# Patient Record
Sex: MALE | Race: WHITE | NOT HISPANIC OR LATINO | URBAN - METROPOLITAN AREA
[De-identification: names, ages, dates, MRNs, and addresses within clinical notes are randomized per-mention and may not be internally consistent; named-entity substitution may affect disease eponyms.]

---

## 2022-11-21 ENCOUNTER — OFFICE (OUTPATIENT)
Dept: URBAN - METROPOLITAN AREA CLINIC 29 | Facility: CLINIC | Age: 22
Setting detail: OPHTHALMOLOGY
End: 2022-11-21
Payer: COMMERCIAL

## 2022-11-21 ENCOUNTER — RX ONLY (RX ONLY)
Age: 22
End: 2022-11-21

## 2022-11-21 DIAGNOSIS — H52.13: ICD-10-CM

## 2022-11-21 DIAGNOSIS — H01.001: ICD-10-CM

## 2022-11-21 DIAGNOSIS — H01.005: ICD-10-CM

## 2022-11-21 DIAGNOSIS — H01.004: ICD-10-CM

## 2022-11-21 DIAGNOSIS — H16.223: ICD-10-CM

## 2022-11-21 DIAGNOSIS — H01.002: ICD-10-CM

## 2022-11-21 PROCEDURE — 92004 COMPRE OPH EXAM NEW PT 1/>: CPT | Performed by: OPTOMETRIST

## 2022-11-21 PROCEDURE — 92015 DETERMINE REFRACTIVE STATE: CPT | Performed by: OPTOMETRIST

## 2022-11-21 ASSESSMENT — REFRACTION_MANIFEST
OS_AXIS: 170
OS_VA1: 20/20
OD_CYLINDER: +0.75
OD_SPHERE: -0.75
OS_CYLINDER: +0.50
OD_VA1: 20/20
OD_AXIS: 170
OS_SPHERE: -0.75

## 2022-11-21 ASSESSMENT — TEAR BREAK UP TIME (TBUT)
OS_TBUT: 1+
OD_TBUT: 1+

## 2022-11-21 ASSESSMENT — SPHEQUIV_DERIVED
OD_SPHEQUIV: -0.375
OS_SPHEQUIV: -0.5
OS_SPHEQUIV: -0.5
OD_SPHEQUIV: -0.25

## 2022-11-21 ASSESSMENT — REFRACTION_AUTOREFRACTION
OD_CYLINDER: +1.00
OD_SPHERE: -0.75
OS_CYLINDER: +0.50
OS_SPHERE: -0.75
OD_AXIS: 170
OS_AXIS: 170

## 2022-11-21 ASSESSMENT — VISUAL ACUITY
OD_BCVA: 20/20-2
OS_BCVA: 20/25

## 2022-11-21 ASSESSMENT — CONFRONTATIONAL VISUAL FIELD TEST (CVF)
OS_FINDINGS: FULL
OD_FINDINGS: FULL

## 2022-11-21 ASSESSMENT — TONOMETRY
OD_IOP_MMHG: 15
OS_IOP_MMHG: 15

## 2022-11-21 ASSESSMENT — LID EXAM ASSESSMENTS
OS_BLEPHARITIS: LLL LUL 1+
OD_BLEPHARITIS: RLL RUL 1+

## 2024-03-19 ENCOUNTER — HOSPITAL ENCOUNTER (EMERGENCY)
Facility: OTHER | Age: 24
Discharge: HOME | End: 2024-03-19
Attending: EMERGENCY MEDICINE
Payer: COMMERCIAL

## 2024-03-19 VITALS
BODY MASS INDEX: 31.44 KG/M2 | SYSTOLIC BLOOD PRESSURE: 130 MMHG | DIASTOLIC BLOOD PRESSURE: 78 MMHG | TEMPERATURE: 98 F | RESPIRATION RATE: 18 BRPM | WEIGHT: 245 LBS | OXYGEN SATURATION: 99 % | HEART RATE: 85 BPM | HEIGHT: 74 IN

## 2024-03-19 DIAGNOSIS — R19.7 DIARRHEA OF PRESUMED INFECTIOUS ORIGIN: Primary | ICD-10-CM

## 2024-03-19 LAB
ALBUMIN SERPL BCP-MCNC: 4 G/DL (ref 3.5–5.2)
ALP SERPL-CCNC: 75 U/L (ref 55–135)
ALT SERPL W/O P-5'-P-CCNC: 26 U/L (ref 10–44)
ANION GAP SERPL CALC-SCNC: 11 MMOL/L (ref 8–16)
AST SERPL-CCNC: 41 U/L (ref 10–40)
BASOPHILS # BLD AUTO: 0.06 K/UL (ref 0–0.2)
BASOPHILS NFR BLD: 0.7 % (ref 0–1.9)
BILIRUB SERPL-MCNC: 0.2 MG/DL (ref 0.1–1)
BILIRUB UR QL STRIP: NEGATIVE
BUN SERPL-MCNC: 16 MG/DL (ref 6–20)
CALCIUM SERPL-MCNC: 9.6 MG/DL (ref 8.7–10.5)
CHLORIDE SERPL-SCNC: 108 MMOL/L (ref 95–110)
CLARITY UR: CLEAR
CO2 SERPL-SCNC: 21 MMOL/L (ref 23–29)
COLOR UR: YELLOW
CREAT SERPL-MCNC: 1 MG/DL (ref 0.5–1.4)
DIFFERENTIAL METHOD BLD: ABNORMAL
EOSINOPHIL # BLD AUTO: 0.2 K/UL (ref 0–0.5)
EOSINOPHIL NFR BLD: 2 % (ref 0–8)
ERYTHROCYTE [DISTWIDTH] IN BLOOD BY AUTOMATED COUNT: 11.2 % (ref 11.5–14.5)
EST. GFR  (NO RACE VARIABLE): >60 ML/MIN/1.73 M^2
GLUCOSE SERPL-MCNC: 82 MG/DL (ref 70–110)
GLUCOSE UR QL STRIP: NEGATIVE
HCT VFR BLD AUTO: 43.8 % (ref 40–54)
HGB BLD-MCNC: 15.4 G/DL (ref 14–18)
HGB UR QL STRIP: NEGATIVE
IMM GRANULOCYTES # BLD AUTO: 0.03 K/UL (ref 0–0.04)
IMM GRANULOCYTES NFR BLD AUTO: 0.4 % (ref 0–0.5)
KETONES UR QL STRIP: NEGATIVE
LEUKOCYTE ESTERASE UR QL STRIP: NEGATIVE
LYMPHOCYTES # BLD AUTO: 2.4 K/UL (ref 1–4.8)
LYMPHOCYTES NFR BLD: 29.9 % (ref 18–48)
MCH RBC QN AUTO: 31.4 PG (ref 27–31)
MCHC RBC AUTO-ENTMCNC: 35.2 G/DL (ref 32–36)
MCV RBC AUTO: 89 FL (ref 82–98)
MONOCYTES # BLD AUTO: 0.7 K/UL (ref 0.3–1)
MONOCYTES NFR BLD: 8.6 % (ref 4–15)
NEUTROPHILS # BLD AUTO: 4.8 K/UL (ref 1.8–7.7)
NEUTROPHILS NFR BLD: 58.4 % (ref 38–73)
NITRITE UR QL STRIP: NEGATIVE
NRBC BLD-RTO: 0 /100 WBC
PH UR STRIP: 6 [PH] (ref 5–8)
PLATELET # BLD AUTO: 282 K/UL (ref 150–450)
PMV BLD AUTO: 9.9 FL (ref 9.2–12.9)
POTASSIUM SERPL-SCNC: 3.9 MMOL/L (ref 3.5–5.1)
PROT SERPL-MCNC: 7.3 G/DL (ref 6–8.4)
PROT UR QL STRIP: ABNORMAL
RBC # BLD AUTO: 4.9 M/UL (ref 4.6–6.2)
SODIUM SERPL-SCNC: 140 MMOL/L (ref 136–145)
SP GR UR STRIP: 1.03 (ref 1–1.03)
URN SPEC COLLECT METH UR: ABNORMAL
UROBILINOGEN UR STRIP-ACNC: NEGATIVE EU/DL
WBC # BLD AUTO: 8.17 K/UL (ref 3.9–12.7)

## 2024-03-19 PROCEDURE — 80053 COMPREHEN METABOLIC PANEL: CPT | Performed by: NURSE PRACTITIONER

## 2024-03-19 PROCEDURE — 25000003 PHARM REV CODE 250: Performed by: EMERGENCY MEDICINE

## 2024-03-19 PROCEDURE — 85025 COMPLETE CBC W/AUTO DIFF WBC: CPT | Performed by: NURSE PRACTITIONER

## 2024-03-19 PROCEDURE — 99284 EMERGENCY DEPT VISIT MOD MDM: CPT

## 2024-03-19 PROCEDURE — 81003 URINALYSIS AUTO W/O SCOPE: CPT | Performed by: NURSE PRACTITIONER

## 2024-03-19 RX ORDER — DICYCLOMINE HYDROCHLORIDE 20 MG/1
20 TABLET ORAL 3 TIMES DAILY PRN
Qty: 30 TABLET | Refills: 0 | Status: SHIPPED | OUTPATIENT
Start: 2024-03-19 | End: 2024-04-18

## 2024-03-19 RX ORDER — CIPROFLOXACIN 500 MG/1
500 TABLET ORAL 2 TIMES DAILY
Qty: 10 TABLET | Refills: 0 | Status: SHIPPED | OUTPATIENT
Start: 2024-03-19 | End: 2024-03-24

## 2024-03-19 RX ORDER — CIPROFLOXACIN 500 MG/1
500 TABLET ORAL
Status: COMPLETED | OUTPATIENT
Start: 2024-03-19 | End: 2024-03-19

## 2024-03-19 RX ADMIN — CIPROFLOXACIN 500 MG: 500 TABLET, FILM COATED ORAL at 07:03

## 2024-03-19 NOTE — FIRST PROVIDER EVALUATION
Emergency Department TeleTriage Encounter Note      CHIEF COMPLAINT    Chief Complaint   Patient presents with    Diarrhea     Pt has had diarrhea x1 week, no abdominal pain. Pt states that around the time he started to have diarrhea he ate 36 raw oysters in one sitting.       VITAL SIGNS   Initial Vitals [03/19/24 1748]   BP Pulse Resp Temp SpO2   130/78 85 18 98 °F (36.7 °C) 99 %      MAP       --            ALLERGIES    Review of patient's allergies indicates:  No Known Allergies    PROVIDER TRIAGE NOTE  Verbal consent for the teletriage evaluation was given by the patient at the start of the evaluation.  All efforts will be made to maintain patient's privacy during the evaluation.      This is a teletriage evaluation of a 23 y.o. male presenting to the ED with c/o diarrhea for 1 week after eating oysters.  No known sick contacts.  Approximately 1 episode of diarrhea daily and is loose not watery. Denies abd pain.  Limited physical exam via telehealth: The patient is awake, alert, answering questions appropriately and is not in respiratory distress.  As the Teletriage provider, I performed an initial assessment and ordered appropriate labs and imaging studies, if any, to facilitate the patient's care once placed in the ED. Once a room is available, care and a full evaluation will be completed by an alternate ED provider.  Any additional orders and the final disposition will be determined by that provider.  All imaging and labs will not be followed-up by the Teletriage Team, including myself.          ORDERS  Labs Reviewed - No data to display    ED Orders (720h ago, onward)      Start Ordered     Status Ordering Provider    03/19/24 1755 03/19/24 1754  Vital signs  Every 2 hours         Ordered VÍCTOR HAGAN    03/19/24 1755 03/19/24 1754  Diet NPO  Diet effective now         Ordered VÍCTOR HAGAN    03/19/24 1755 03/19/24 1754  Insert peripheral IV  Once         Ordered VÍCTOR HAGAN    03/19/24 1755 03/19/24  1754  CBC W/ AUTO DIFFERENTIAL  STAT         Ordered VÍCTOR HAGAN    03/19/24 1755 03/19/24 1754  Comp. Metabolic Panel  STAT         Ordered VÍCTOR HAGAN    03/19/24 1755 03/19/24 1754  Urinalysis, Reflex to Urine Culture Urine, Clean Catch  STAT         Ordered VÍCTOR HAGAN    03/19/24 1755 03/19/24 1754  Stool culture **CANNOT BE ORDERED STAT**  Once         Ordered VÍCTOR HAGAN    03/19/24 1755 03/19/24 1754  WBC, Stool  Once         Ordered VÍCTOR HAGAN    03/19/24 1755 03/19/24 1754  Stool Exam-Ova,Cysts,Parasites  Once         Ordered VÍCTOR HAGAN    03/19/24 1755 03/19/24 1754  Giardia / Cryptosporidum, EIA  Once         Ordered VÍCTOR HAGAN IAN              Virtual Visit Note: The provider triage portion of this emergency department evaluation and documentation was performed via [employer], a HIPAA-compliant telemedicine application, in concert with a tele-presenter in the room. A face to face patient evaluation with one of my colleagues will occur once the patient is placed in an emergency department room.      DISCLAIMER: This note was prepared with [employer] voice recognition transcription software. Garbled syntax, mangled pronouns, and other bizarre constructions may be attributed to that software system.

## 2024-03-20 NOTE — ED PROVIDER NOTES
"  Source of History:  Medical record, patient      Chief complaint:  Per triage note: "Diarrhea (Pt has had diarrhea x1 week, no abdominal pain. Pt states that around the time he started to have diarrhea he ate 36 raw oysters in one sitting.)  "    HPI:    Patient presents for evaluation of watery, no loose stools for 1 week after eating 3 dozen oysters.  He and several other friends decided to have a contest to see who could eat the most oysters.  He lost by four oysters.  Denies any associated abdominal pain after some mild tightness and discomfort" the first day or two.  He denies any sick contacts.  Denies any travel.  Denies any other suspicious intake.  Denies any associated fevers, urinary symptoms, or any other complaints.  Symptoms are improving in that patient only has loose stool once a day at about the same time that he usually has a bowel movement.       ROS:   See HPI for pertinent review of systems      Review of patient's allergies indicates:  No Known Allergies    PMH:  As per HPI and below:  No past medical history on file.    No past surgical history on file.         Physical Exam:      Nursing note and vitals reviewed.  /78 (BP Location: Left arm, Patient Position: Sitting)   Pulse 85   Temp 98 °F (36.7 °C) (Oral)   Resp 18   Ht 6' 2" (1.88 m)   Wt 111.1 kg (245 lb)   SpO2 99%   BMI 31.46 kg/m²     Constitutional: No distress.  Eyes: EOMI. No discharge. Anicteric.  HENT:   Neck: Normal range of motion. Neck supple.  Cardiovascular: Normal rate. No murmur, no gallop and no friction rub heard.   Pulmonary/Chest: No respiratory distress. Effort normal. No wheezes, no rales, no rhonchi.   Abdominal: Bowel sounds normal. Soft. No distension and no mass. There is no tenderness. There is no rebound, no guarding, no tenderness at McBurney's point.  Neurological: GCS 15. Alert and oriented to person, place, and time. No gross cranial nerve, light touch or strength deficit. Coordination " normal.   Skin: Skin is warm and dry.   EXT: 2+ radial pulses.         Medical Decision Making / Independent Interpretations / External Records Reviewed:      Patient is a 23-year-old male with no reported past medical history presents with diarrhea after eating 36 raw oysters 1 week ago.  Denies any sick contacts, or suspicious intake, or travel.  On exam patient is well-appearing with no abdominal tenderness.    The initial differential included infectious diarrhea, malabsorption, dehydration, gross metabolic abnormality.  I independently interpreted labs which are unremarkable and unrevealing noting minimal metabolic acidosis.  I doubt sepsis, TTP/HUS  Patient was unable to give a stool sample while in the emergency department.  Will start empiric antibiotics for presumed infectious diarrhea.  --  I discussed with pt and/or guardian/caretaker that this evaluation in the ED does not suggest any emergent or life threatening medical condition requiring admission or further immediate intervention or diagnostics. Regardless, an unremarkable evaluation in the ED does not preclude the development or presence of a serious or life threatening condition. Pt was instructed to return for any worsening, new, changed, or concerning symptoms.     I had a detailed discussion with patient and/or guardian/caretaker regarding findings, plan, return precautions, importance of medication adherence, need to follow-up with a PCP and specialist. All questions answered.     Note was created using voice recognition software. It may have occasional typographical errors not identified and edited despite initial review prior to signing.             --  I decided to obtain the patient's medical records. I reviewed patient's prior external notes / results: external hospital emergency department encounter.   --  Additional Medical Decision Making: Prescription drug management    Medications   ciprofloxacin HCl tablet 500 mg (500 mg Oral Given  3/19/24 1935)              No future appointments.     Diagnostic Impression:    1. Diarrhea of presumed infectious origin         ED Disposition Condition    Discharge Good          ED Prescriptions       Medication Sig Dispense Start Date End Date Auth. Provider    ciprofloxacin HCl (CIPRO) 500 MG tablet Take 1 tablet (500 mg total) by mouth 2 (two) times daily. for 5 days 10 tablet 3/19/2024 3/24/2024 Adalberto Monahan MD    dicyclomine (BENTYL) 20 mg tablet Take 1 tablet (20 mg total) by mouth 3 (three) times daily as needed (diarrhea or abdominal cramping). 30 tablet 3/19/2024 4/18/2024 Adalberto Monahan MD          Follow-up Information       Follow up With Specialties Details Why Contact Info    Your primary care doctor  In 2 days For recheck with your primary care doctor     [facility] Gastroenterology Schedule an appointment as soon as possible for a visit in 1 week For recheck with specialist, if your symptoms continue [address] [place] [id number]  [phone]               Adalberto Monahan MD  03/19/24 1942

## 2024-03-20 NOTE — DISCHARGE INSTRUCTIONS
Thank you for letting us take care of you today! It was nice meeting you, and I hope you feel better soon.     Call your primary care doctor to make the first available appointment.     Keep all your medical appointments.     Take your regular medication as prescribed. Contact your primary care provider before running out of medication, or for any problems obtaining them.    Do not drive or operate heavy machinery while taking opioid or muscle relaxing medications. Examples include norco, percocet, xanax, valium, flexeril.     Overuse or long term use of pain and sedating medication may lead to addiction, dependence, organ failure, family and work problems, legal problems, accidental overdose, and death.    If you do not have health insurance, you probably can afford it:  Call [phone] ([facility] hotline) or go to www.[place].gov    Your evaluation in the ER does not suggest any emergent or life threatening medical condition requiring admission or immediate intervention beyond that provided in the ER.   However, the signs of a serious problem sometimes take more time to appear.     Do not hesitate to return to the ER if any of the following occur:    Weakness, dizziness, fainting, or loss of consciousness   Fever of 100.4ºF (38ºC) or higher  Any worse symptoms  Any new or concerning symptoms    Causes of Diarrhea (Adult)  Diarrhea is when stools are loose and watery. This can be caused by:  Viral infections  Bacterial infections  Food poisoning  Parasites  Irritable bowel syndrome (IBS)  Inflammatory bowel diseases such as ulcerative colitis, Crohn's disease, and celiac disease  Food intolerance, such as to lactose, the sugar found in milk and milk products  Reaction to medicines like antibiotics, laxatives, cancer drugs, and antacids  Along with diarrhea, you may also have:  Abdominal pain and cramping  Nausea and vomiting  Loss of bowel control  Fever and chills  Bloody stools  In some cases, antibiotics may  help to treat diarrhea. You may have a stool sample test. This is done to see what is causing your diarrhea, and if antibiotics will help treat it. The results of a stool sample test may take up to 2 days. The healthcare provider may not give you antibiotics until he or she has the stool test results.  Diarrhea can cause dehydration. This is the loss of too much water and other fluids from the body. When this occurs, body fluid must be replaced. This can be done with oral rehydration solutions. Oral rehydration solutions are available at drugstores and grocery stores without a prescription.  Home care  Follow all instructions given by your healthcare provider. Rest at home for the next 24 hours, or until you feel better. Avoid caffeine, tobacco, and alcohol. These can make diarrhea, cramping, and pain worse.  If taking medicines:  Dont take over-the-counter diarrhea or nausea medicines unless your healthcare provider tells you to.  You may use acetaminophen or NSAID medicines like ibuprofen or naproxen to reduce pain and fever. Dont use these if you have chronic liver or kidney disease, or ever had a stomach ulcer or gastrointestinal bleeding. Don't use NSAID medicines if you are already taking one for another condition (like arthritis) or are on daily aspirin therapy (such as for heart disease or after a stroke). Talk with your healthcare provider first.  If antibiotics were prescribed, be sure you take them until they are finished. Dont stop taking them even when you feel better. Antibiotics must be taken as a full course.  To prevent the spread of illness:  Remember that washing with soap and water and using alcohol-based  is the best way to prevent the spread of infection.  Clean the toilet after each use.  Wash your hands before eating.  Wash your hands before and after preparing food. Keep in mind that people with diarrhea or vomiting should not prepare food for others.  Wash your hands after using  cutting boards, countertops, and knives that have been in contact with raw foods.  Wash and then peel fruits and vegetables.  Keep uncooked meats away from cooked and ready-to-eat foods.  Use a food thermometer when cooking. Cook poultry to at least 165°F (74°C). Cook ground meat (beef, veal, pork, lamb) to at least 160°F (71°C). Cook fresh beef, veal, lamb, and pork to at least 145°F (63°C).  Dont eat raw or undercooked eggs (poached or christelle side up), poultry, meat, or unpasteurized milk and juices.  Food and drinks  The main goal while treating vomiting or diarrhea is to prevent dehydration. This is done by taking small amounts of liquids often.  Keep in mind that liquids are more important than food right now. It is ok to not eat for a few days.   Drink only small amounts of liquids at a time.  Dont force yourself to eat, especially if you are having cramping, vomiting, or diarrhea. Dont eat large amounts at a time, even if you are hungry.  If you eat, avoid fatty, greasy, spicy, or fried foods.  Dont eat dairy foods or drink milk if you have diarrhea. These can make diarrhea worse.  During the first 24 hours you can try:  Oral rehydration solutions. Do not use sports drinks. They have too much sugar and not enough electrolytes.  Soft drinks without caffeine  Ginger ale  Water (plain or flavored)  Decaf tea or coffee  Clear broth, consommé, or bouillon  Gelatin, popsicles, or frozen fruit juice bars  The second 24 hours, if you are feeling better, you can add:  Hot cereal, plain toast, bread, rolls, or crackers  Plain noodles, rice, mashed potatoes, chicken noodle soup, or rice soup  Unsweetened canned fruit (no pineapple)  Bananas  As you recover:  Limit fat intake to less than 15 grams per day. Dont eat margarine, butter, oils, mayonnaise, sauces, gravies, fried foods, peanut butter, meat, poultry, or fish.  Limit fiber. Dont eat raw or cooked vegetables, fresh fruits except bananas, or bran  cereals.  Limit caffeine and chocolate.  Limit dairy.  Dont use spices or seasonings except salt.  Go back to your normal diet over time, as you feel better and your symptoms improve.  If the symptoms come back, go back to a simple diet or clear liquids.  Follow-up care  Follow up with your healthcare provider, or as advised. If a stool sample was taken or cultures were done, call the healthcare provider for the results as instructed.  Call 911  Call 911 if you have any of these symptoms:  Trouble breathing  Confusion  Extreme drowsiness or trouble walking  Loss of consciousness  Rapid heart rate  Chest pain  Stiff neck  Seizure  When to seek medical advice  Call your healthcare provider right away if any of these occur:  Abdominal pain that gets worse  Constant lower right abdominal pain  Continued vomiting and inability to keep liquids down  Blood in vomit or stool  Dark urine or no urine for 8 hours  dizziness  Passing out or almost passing out  New rash  You dont get better in 2 to 3 days  Fever of 100.4°F (38°C) or higher that doesnt get lower with medicine